# Patient Record
Sex: MALE | Race: WHITE | Employment: FULL TIME | URBAN - METROPOLITAN AREA
[De-identification: names, ages, dates, MRNs, and addresses within clinical notes are randomized per-mention and may not be internally consistent; named-entity substitution may affect disease eponyms.]

---

## 2017-01-05 ENCOUNTER — OFFICE VISIT (OUTPATIENT)
Dept: DERMATOLOGY | Age: 52
End: 2017-01-05

## 2017-01-05 DIAGNOSIS — L91.8 SKIN TAG: ICD-10-CM

## 2017-01-05 DIAGNOSIS — L71.9 ROSACEA: ICD-10-CM

## 2017-01-05 DIAGNOSIS — R20.9 DISTURBANCE OF SKIN SENSATION: ICD-10-CM

## 2017-01-05 DIAGNOSIS — Z12.83 SCREENING EXAM FOR SKIN CANCER: ICD-10-CM

## 2017-01-05 DIAGNOSIS — D22.9 MULTIPLE BENIGN NEVI: Primary | ICD-10-CM

## 2017-01-05 PROCEDURE — 99214 OFFICE O/P EST MOD 30 MIN: CPT | Performed by: DERMATOLOGY

## 2017-01-05 PROCEDURE — 11200 RMVL SKIN TAGS UP TO&INC 15: CPT | Performed by: DERMATOLOGY

## 2017-01-05 RX ORDER — AZELAIC ACID 0.15 G/G
GEL TOPICAL
Qty: 1 TUBE | Refills: 3 | Status: SHIPPED | OUTPATIENT
Start: 2017-01-05 | End: 2019-01-14 | Stop reason: SDUPTHER

## 2017-01-05 RX ORDER — NICOTINE POLACRILEX 4 MG/1
20 GUM, CHEWING ORAL DAILY
COMMUNITY
End: 2017-09-18 | Stop reason: ALTCHOICE

## 2017-01-05 RX ORDER — LOSARTAN POTASSIUM AND HYDROCHLOROTHIAZIDE 12.5; 1 MG/1; MG/1
1 TABLET ORAL DAILY
COMMUNITY
End: 2017-09-18 | Stop reason: SDUPTHER

## 2017-02-02 ENCOUNTER — PROCEDURE VISIT (OUTPATIENT)
Dept: DERMATOLOGY | Age: 52
End: 2017-02-02

## 2017-02-02 DIAGNOSIS — L71.9 ROSACEA: Primary | ICD-10-CM

## 2017-02-02 DIAGNOSIS — Z41.1 ELECTIVE PROCEDURE FOR UNACCEPTABLE COSMETIC APPEARANCE: ICD-10-CM

## 2017-02-02 PROCEDURE — DM01300 VBEAM LASER EXTRA SMALL AREA: Performed by: DERMATOLOGY

## 2017-03-02 ENCOUNTER — PROCEDURE VISIT (OUTPATIENT)
Dept: DERMATOLOGY | Age: 52
End: 2017-03-02

## 2017-03-02 DIAGNOSIS — L71.9 ROSACEA: Primary | ICD-10-CM

## 2017-03-02 DIAGNOSIS — Z41.1 ELECTIVE PROCEDURE FOR UNACCEPTABLE COSMETIC APPEARANCE: ICD-10-CM

## 2017-03-02 PROCEDURE — DM01300 VBEAM LASER EXTRA SMALL AREA: Performed by: DERMATOLOGY

## 2017-04-06 ENCOUNTER — PROCEDURE VISIT (OUTPATIENT)
Dept: DERMATOLOGY | Age: 52
End: 2017-04-06

## 2017-04-06 DIAGNOSIS — L71.9 ROSACEA: Primary | ICD-10-CM

## 2017-04-06 DIAGNOSIS — Z41.1 ELECTIVE PROCEDURE FOR UNACCEPTABLE COSMETIC APPEARANCE: ICD-10-CM

## 2017-04-06 PROCEDURE — DM01300 VBEAM LASER EXTRA SMALL AREA: Performed by: DERMATOLOGY

## 2017-04-07 ENCOUNTER — TELEPHONE (OUTPATIENT)
Dept: INTERNAL MEDICINE CLINIC | Age: 52
End: 2017-04-07

## 2017-04-07 RX ORDER — RABEPRAZOLE SODIUM 20 MG/1
TABLET, DELAYED RELEASE ORAL
Qty: 90 TABLET | Refills: 1 | Status: SHIPPED | OUTPATIENT
Start: 2017-04-07 | End: 2017-10-08 | Stop reason: SDUPTHER

## 2017-05-17 ENCOUNTER — OFFICE VISIT (OUTPATIENT)
Dept: INTERNAL MEDICINE CLINIC | Age: 52
End: 2017-05-17

## 2017-05-17 VITALS — SYSTOLIC BLOOD PRESSURE: 124 MMHG | HEART RATE: 74 BPM | DIASTOLIC BLOOD PRESSURE: 78 MMHG | OXYGEN SATURATION: 97 %

## 2017-05-17 DIAGNOSIS — M77.10 LATERAL EPICONDYLITIS  OF ELBOW: Primary | ICD-10-CM

## 2017-05-17 PROCEDURE — 99213 OFFICE O/P EST LOW 20 MIN: CPT | Performed by: FAMILY MEDICINE

## 2017-06-14 RX ORDER — LOSARTAN POTASSIUM AND HYDROCHLOROTHIAZIDE 12.5; 1 MG/1; MG/1
TABLET ORAL
Qty: 90 TABLET | Refills: 2 | Status: SHIPPED | OUTPATIENT
Start: 2017-06-14 | End: 2018-04-10 | Stop reason: SDUPTHER

## 2017-09-18 ENCOUNTER — HOSPITAL ENCOUNTER (OUTPATIENT)
Dept: GENERAL RADIOLOGY | Age: 52
Discharge: HOME | End: 2017-09-18
Attending: INTERNAL MEDICINE | Admitting: INTERNAL MEDICINE

## 2017-09-18 ENCOUNTER — OFFICE VISIT (OUTPATIENT)
Dept: INTERNAL MEDICINE CLINIC | Age: 52
End: 2017-09-18

## 2017-09-18 VITALS
DIASTOLIC BLOOD PRESSURE: 94 MMHG | OXYGEN SATURATION: 96 % | HEIGHT: 71 IN | SYSTOLIC BLOOD PRESSURE: 136 MMHG | BODY MASS INDEX: 35.42 KG/M2 | WEIGHT: 253 LBS | HEART RATE: 71 BPM

## 2017-09-18 DIAGNOSIS — I10 ESSENTIAL HYPERTENSION: ICD-10-CM

## 2017-09-18 DIAGNOSIS — Z12.9 SCREENING FOR CANCER: ICD-10-CM

## 2017-09-18 DIAGNOSIS — R06.83 SNORES: Primary | ICD-10-CM

## 2017-09-18 DIAGNOSIS — E03.9 ACQUIRED HYPOTHYROIDISM: ICD-10-CM

## 2017-09-18 DIAGNOSIS — R40.0 DAYTIME SLEEPINESS: ICD-10-CM

## 2017-09-18 DIAGNOSIS — E66.9 NON MORBID OBESITY, UNSPECIFIED OBESITY TYPE: ICD-10-CM

## 2017-09-18 DIAGNOSIS — G43.909 MIGRAINE WITHOUT STATUS MIGRAINOSUS, NOT INTRACTABLE, UNSPECIFIED MIGRAINE TYPE: ICD-10-CM

## 2017-09-18 DIAGNOSIS — C80.1 CANCER (HCC): ICD-10-CM

## 2017-09-18 DIAGNOSIS — K22.719 BARRETT'S ESOPHAGUS WITH DYSPLASIA: ICD-10-CM

## 2017-09-18 LAB
A/G RATIO: 1.3 (ref 1.1–2.2)
ALBUMIN SERPL-MCNC: 4 G/DL (ref 3.4–5)
ALP BLD-CCNC: 76 U/L (ref 40–129)
ALT SERPL-CCNC: 24 U/L (ref 10–40)
ANION GAP SERPL CALCULATED.3IONS-SCNC: 14 MMOL/L (ref 3–16)
AST SERPL-CCNC: 22 U/L (ref 15–37)
BILIRUB SERPL-MCNC: 0.8 MG/DL (ref 0–1)
BUN BLDV-MCNC: 14 MG/DL (ref 7–20)
CALCIUM SERPL-MCNC: 9.6 MG/DL (ref 8.3–10.6)
CHLORIDE BLD-SCNC: 96 MMOL/L (ref 99–110)
CHOLESTEROL, TOTAL: 196 MG/DL (ref 0–199)
CO2: 28 MMOL/L (ref 21–32)
CREAT SERPL-MCNC: 0.9 MG/DL (ref 0.9–1.3)
GFR AFRICAN AMERICAN: >60
GFR NON-AFRICAN AMERICAN: >60
GLOBULIN: 3.1 G/DL
GLUCOSE BLD-MCNC: 95 MG/DL (ref 70–99)
HDLC SERPL-MCNC: 36 MG/DL (ref 40–60)
LDL CHOLESTEROL CALCULATED: 134 MG/DL
POTASSIUM SERPL-SCNC: 3.6 MMOL/L (ref 3.5–5.1)
PROSTATE SPECIFIC ANTIGEN: 0.9 NG/ML (ref 0–4)
SODIUM BLD-SCNC: 138 MMOL/L (ref 136–145)
T4 FREE: 1.5 NG/DL (ref 0.9–1.8)
TOTAL PROTEIN: 7.1 G/DL (ref 6.4–8.2)
TRIGL SERPL-MCNC: 131 MG/DL (ref 0–150)
TSH SERPL DL<=0.05 MIU/L-ACNC: 0.66 UIU/ML (ref 0.27–4.2)
VLDLC SERPL CALC-MCNC: 26 MG/DL

## 2017-09-18 PROCEDURE — 99215 OFFICE O/P EST HI 40 MIN: CPT | Performed by: INTERNAL MEDICINE

## 2017-09-18 RX ORDER — AMLODIPINE BESYLATE 5 MG/1
5 TABLET ORAL DAILY
Qty: 30 TABLET | Refills: 3 | Status: SHIPPED | OUTPATIENT
Start: 2017-09-18 | End: 2018-01-22 | Stop reason: SDUPTHER

## 2017-09-18 RX ORDER — LEVOTHYROXINE SODIUM 175 UG/1
TABLET ORAL
Qty: 34 TABLET | Refills: 9 | Status: SHIPPED | OUTPATIENT
Start: 2017-09-18 | End: 2018-01-22 | Stop reason: SDUPTHER

## 2017-09-18 ASSESSMENT — PATIENT HEALTH QUESTIONNAIRE - PHQ9
1. LITTLE INTEREST OR PLEASURE IN DOING THINGS: 0
SUM OF ALL RESPONSES TO PHQ QUESTIONS 1-9: 0
SUM OF ALL RESPONSES TO PHQ9 QUESTIONS 1 & 2: 0
2. FEELING DOWN, DEPRESSED OR HOPELESS: 0

## 2017-09-21 ENCOUNTER — TELEPHONE (OUTPATIENT)
Dept: INTERNAL MEDICINE CLINIC | Age: 52
End: 2017-09-21

## 2017-11-03 ENCOUNTER — NURSE ONLY (OUTPATIENT)
Dept: INTERNAL MEDICINE CLINIC | Age: 52
End: 2017-11-03

## 2017-11-03 DIAGNOSIS — Z23 NEED FOR PROPHYLACTIC VACCINATION AND INOCULATION AGAINST INFLUENZA: Primary | ICD-10-CM

## 2017-11-03 PROCEDURE — 90688 IIV4 VACCINE SPLT 0.5 ML IM: CPT | Performed by: NURSE PRACTITIONER

## 2017-11-03 PROCEDURE — 90471 IMMUNIZATION ADMIN: CPT | Performed by: NURSE PRACTITIONER

## 2017-12-30 DIAGNOSIS — J06.9 UPPER RESPIRATORY TRACT INFECTION, UNSPECIFIED TYPE: Primary | ICD-10-CM

## 2017-12-30 RX ORDER — AZITHROMYCIN 250 MG/1
TABLET, FILM COATED ORAL
Qty: 1 PACKET | Refills: 0 | Status: SHIPPED | OUTPATIENT
Start: 2017-12-30 | End: 2018-01-09

## 2018-01-22 ENCOUNTER — TELEPHONE (OUTPATIENT)
Dept: INTERNAL MEDICINE CLINIC | Age: 53
End: 2018-01-22

## 2018-01-22 RX ORDER — RABEPRAZOLE SODIUM 20 MG/1
20 TABLET, DELAYED RELEASE ORAL DAILY
Qty: 90 TABLET | Refills: 3 | Status: SHIPPED | OUTPATIENT
Start: 2018-01-22 | End: 2018-07-19

## 2018-01-22 RX ORDER — AMLODIPINE BESYLATE 5 MG/1
5 TABLET ORAL DAILY
Qty: 90 TABLET | Refills: 3 | Status: SHIPPED | OUTPATIENT
Start: 2018-01-22 | End: 2018-12-29 | Stop reason: SDUPTHER

## 2018-01-22 RX ORDER — LEVOTHYROXINE SODIUM 175 UG/1
TABLET ORAL
Qty: 100 TABLET | Refills: 3 | Status: SHIPPED | OUTPATIENT
Start: 2018-01-22 | End: 2019-01-01 | Stop reason: SDUPTHER

## 2018-01-22 NOTE — TELEPHONE ENCOUNTER
Refill request for LEVOTHYROXINE, RABEPRAZOLE, AMOLDIPINE medication.      Name of Andrzej Contreras      Last visit - 11/3/17     Pending visit - NONE    Last refill - LEVOTHYROXINE 9/18/17, RABEPRAZOLE 10/8/17, AMLODIPINE 9/18/17      Medication Contract signed -   Last Oarrs ran-         Additional Comments

## 2018-02-23 RX ORDER — ZOLPIDEM TARTRATE 5 MG/1
2.5 TABLET ORAL NIGHTLY PRN
Qty: 8 TABLET | Refills: 0 | Status: SHIPPED | OUTPATIENT
Start: 2018-02-23 | End: 2018-03-25

## 2018-04-11 RX ORDER — LOSARTAN POTASSIUM AND HYDROCHLOROTHIAZIDE 12.5; 1 MG/1; MG/1
TABLET ORAL
Qty: 90 TABLET | Refills: 1 | Status: SHIPPED | OUTPATIENT
Start: 2018-04-11 | End: 2018-10-21 | Stop reason: SDUPTHER

## 2018-07-19 ENCOUNTER — TELEPHONE (OUTPATIENT)
Dept: INTERNAL MEDICINE CLINIC | Age: 53
End: 2018-07-19

## 2018-07-19 DIAGNOSIS — I10 ESSENTIAL HYPERTENSION: Primary | ICD-10-CM

## 2018-07-19 RX ORDER — OMEPRAZOLE 20 MG/1
20 CAPSULE, DELAYED RELEASE ORAL
Qty: 90 CAPSULE | Refills: 0 | Status: SHIPPED | OUTPATIENT
Start: 2018-07-19 | End: 2018-09-30 | Stop reason: SDUPTHER

## 2018-07-19 NOTE — TELEPHONE ENCOUNTER
: Patient: Change in medicine request emailed to pharmacy. Patient past due for chemistry panel which has been ordered in Epic before office visit with me.   Dr. day

## 2018-12-30 RX ORDER — OMEPRAZOLE 20 MG/1
CAPSULE, DELAYED RELEASE ORAL
Qty: 90 CAPSULE | Refills: 0 | Status: SHIPPED | OUTPATIENT
Start: 2018-12-30 | End: 2019-03-29 | Stop reason: SDUPTHER

## 2019-01-01 ENCOUNTER — TELEPHONE (OUTPATIENT)
Dept: INTERNAL MEDICINE CLINIC | Age: 54
End: 2019-01-01

## 2019-01-01 DIAGNOSIS — Z12.5 SPECIAL SCREENING FOR MALIGNANT NEOPLASM OF PROSTATE: ICD-10-CM

## 2019-01-01 DIAGNOSIS — E89.0 POSTOPERATIVE HYPOTHYROIDISM: ICD-10-CM

## 2019-01-01 DIAGNOSIS — C80.1 CANCER (HCC): Primary | ICD-10-CM

## 2019-01-01 RX ORDER — LEVOTHYROXINE SODIUM 175 UG/1
TABLET ORAL
Qty: 100 TABLET | Refills: 0 | Status: SHIPPED | OUTPATIENT
Start: 2019-01-01 | End: 2019-03-30 | Stop reason: SDUPTHER

## 2019-01-08 DIAGNOSIS — I10 ESSENTIAL HYPERTENSION: Primary | ICD-10-CM

## 2019-01-08 DIAGNOSIS — C80.1 CANCER (HCC): Primary | ICD-10-CM

## 2019-01-08 RX ORDER — LOSARTAN POTASSIUM AND HYDROCHLOROTHIAZIDE 12.5; 1 MG/1; MG/1
TABLET ORAL
Qty: 30 TABLET | Refills: 0 | Status: SHIPPED | OUTPATIENT
Start: 2019-01-08 | End: 2019-02-13 | Stop reason: SDUPTHER

## 2019-01-14 ENCOUNTER — OFFICE VISIT (OUTPATIENT)
Dept: INTERNAL MEDICINE CLINIC | Age: 54
End: 2019-01-14
Payer: COMMERCIAL

## 2019-01-14 ENCOUNTER — HOSPITAL ENCOUNTER (OUTPATIENT)
Age: 54
Discharge: HOME | End: 2019-01-14
Payer: COMMERCIAL

## 2019-01-14 VITALS
HEIGHT: 71 IN | SYSTOLIC BLOOD PRESSURE: 132 MMHG | DIASTOLIC BLOOD PRESSURE: 84 MMHG | BODY MASS INDEX: 36.68 KG/M2 | HEART RATE: 71 BPM | OXYGEN SATURATION: 97 % | WEIGHT: 262 LBS

## 2019-01-14 DIAGNOSIS — K21.9 GASTROESOPHAGEAL REFLUX DISEASE WITHOUT ESOPHAGITIS: ICD-10-CM

## 2019-01-14 DIAGNOSIS — C80.1 CANCER (HCC): ICD-10-CM

## 2019-01-14 DIAGNOSIS — L71.9 ROSACEA: ICD-10-CM

## 2019-01-14 DIAGNOSIS — E66.9 NON MORBID OBESITY: ICD-10-CM

## 2019-01-14 DIAGNOSIS — E03.9 ACQUIRED HYPOTHYROIDISM: ICD-10-CM

## 2019-01-14 DIAGNOSIS — I10 ESSENTIAL HYPERTENSION: Primary | ICD-10-CM

## 2019-01-14 DIAGNOSIS — Z23 NEED FOR PROPHYLACTIC VACCINATION WITH TETANUS-DIPHTHERIA (TD): ICD-10-CM

## 2019-01-14 DIAGNOSIS — E89.0 POSTOPERATIVE HYPOTHYROIDISM: ICD-10-CM

## 2019-01-14 DIAGNOSIS — Z12.5 SPECIAL SCREENING FOR MALIGNANT NEOPLASM OF PROSTATE: ICD-10-CM

## 2019-01-14 DIAGNOSIS — I10 ESSENTIAL HYPERTENSION: ICD-10-CM

## 2019-01-14 DIAGNOSIS — K22.719 BARRETT'S ESOPHAGUS WITH DYSPLASIA: ICD-10-CM

## 2019-01-14 LAB
A/G RATIO: 1.6 (ref 1.1–2.2)
ALBUMIN SERPL-MCNC: 4.4 G/DL (ref 3.4–5)
ALP BLD-CCNC: 71 U/L (ref 40–129)
ALT SERPL-CCNC: 25 U/L (ref 10–40)
ANION GAP SERPL CALCULATED.3IONS-SCNC: 16 MMOL/L (ref 3–16)
AST SERPL-CCNC: 24 U/L (ref 15–37)
BILIRUB SERPL-MCNC: 0.9 MG/DL (ref 0–1)
BUN BLDV-MCNC: 14 MG/DL (ref 7–20)
CALCIUM SERPL-MCNC: 9.4 MG/DL (ref 8.3–10.6)
CHLORIDE BLD-SCNC: 99 MMOL/L (ref 99–110)
CO2: 25 MMOL/L (ref 21–32)
CREAT SERPL-MCNC: 1 MG/DL (ref 0.9–1.3)
GFR AFRICAN AMERICAN: >60
GFR NON-AFRICAN AMERICAN: >60
GLOBULIN: 2.7 G/DL
GLUCOSE BLD-MCNC: 93 MG/DL (ref 70–99)
POTASSIUM SERPL-SCNC: 3.6 MMOL/L (ref 3.5–5.1)
PROSTATE SPECIFIC ANTIGEN: 0.68 NG/ML (ref 0–4)
SODIUM BLD-SCNC: 140 MMOL/L (ref 136–145)
T4 FREE: 1.5 NG/DL (ref 0.9–1.8)
TOTAL PROTEIN: 7.1 G/DL (ref 6.4–8.2)
TSH SERPL DL<=0.05 MIU/L-ACNC: 1.63 UIU/ML (ref 0.27–4.2)

## 2019-01-14 PROCEDURE — 36415 COLL VENOUS BLD VENIPUNCTURE: CPT

## 2019-01-14 PROCEDURE — 84153 ASSAY OF PSA TOTAL: CPT

## 2019-01-14 PROCEDURE — 90714 TD VACC NO PRESV 7 YRS+ IM: CPT | Performed by: INTERNAL MEDICINE

## 2019-01-14 PROCEDURE — 84439 ASSAY OF FREE THYROXINE: CPT

## 2019-01-14 PROCEDURE — 80053 COMPREHEN METABOLIC PANEL: CPT

## 2019-01-14 PROCEDURE — 84443 ASSAY THYROID STIM HORMONE: CPT

## 2019-01-14 PROCEDURE — 90471 IMMUNIZATION ADMIN: CPT | Performed by: INTERNAL MEDICINE

## 2019-01-14 PROCEDURE — 99214 OFFICE O/P EST MOD 30 MIN: CPT | Performed by: INTERNAL MEDICINE

## 2019-01-14 RX ORDER — AZELAIC ACID 0.15 G/G
GEL TOPICAL
Qty: 1 TUBE | Refills: 3 | Status: SHIPPED | OUTPATIENT
Start: 2019-01-14 | End: 2019-01-16 | Stop reason: SDUPTHER

## 2019-01-14 ASSESSMENT — PATIENT HEALTH QUESTIONNAIRE - PHQ9
1. LITTLE INTEREST OR PLEASURE IN DOING THINGS: 0
SUM OF ALL RESPONSES TO PHQ9 QUESTIONS 1 & 2: 0
SUM OF ALL RESPONSES TO PHQ QUESTIONS 1-9: 0
SUM OF ALL RESPONSES TO PHQ QUESTIONS 1-9: 0
2. FEELING DOWN, DEPRESSED OR HOPELESS: 0

## 2019-01-15 DIAGNOSIS — L71.9 ROSACEA: ICD-10-CM

## 2019-01-15 RX ORDER — AZELAIC ACID 0.15 G/G
GEL TOPICAL
Qty: 1 TUBE | Refills: 3 | Status: CANCELLED | OUTPATIENT
Start: 2019-01-15

## 2019-01-16 DIAGNOSIS — L71.9 ROSACEA: ICD-10-CM

## 2019-01-16 RX ORDER — AZELAIC ACID 0.15 G/G
GEL TOPICAL
Qty: 1 TUBE | Refills: 3 | Status: SHIPPED | OUTPATIENT
Start: 2019-01-16 | End: 2019-12-03 | Stop reason: ALTCHOICE

## 2019-02-13 DIAGNOSIS — I10 ESSENTIAL HYPERTENSION: ICD-10-CM

## 2019-02-13 RX ORDER — LOSARTAN POTASSIUM AND HYDROCHLOROTHIAZIDE 12.5; 1 MG/1; MG/1
TABLET ORAL
Qty: 30 TABLET | Refills: 0 | Status: SHIPPED | OUTPATIENT
Start: 2019-02-13 | End: 2019-03-17 | Stop reason: SDUPTHER

## 2019-03-29 RX ORDER — OMEPRAZOLE 20 MG/1
CAPSULE, DELAYED RELEASE ORAL
Qty: 90 CAPSULE | Refills: 0 | Status: SHIPPED | OUTPATIENT
Start: 2019-03-29 | End: 2019-05-07 | Stop reason: SDUPTHER

## 2019-04-23 ENCOUNTER — PATIENT MESSAGE (OUTPATIENT)
Dept: INTERNAL MEDICINE CLINIC | Age: 54
End: 2019-04-23

## 2019-04-23 DIAGNOSIS — Z00.00 ROUTINE GENERAL MEDICAL EXAMINATION AT A HEALTH CARE FACILITY: Primary | ICD-10-CM

## 2019-04-23 NOTE — TELEPHONE ENCOUNTER
From: Josh Russo  To: GURPREET Hernandez - CNP  Sent: 4/23/2019 9:12 AM EDT  Subject: Non-Urgent Medical Question    I know I have an appointment coming up July 10 to review blood work you will request prior to my appointment. Can you please make sure they include a TSH and a thyroglobulin? I need to check my markers. Also like to check my blood sugar levels as well. I am required to have the TSH and thyroglobulin tested twice a year for 20 years.  TSH 0.5-2.5 unites/mL and thyroglobulin less than 1 ng/mL    Thanks you

## 2019-04-30 DIAGNOSIS — Z13.1 SCREENING FOR DIABETES MELLITUS (DM): Primary | ICD-10-CM

## 2019-04-30 DIAGNOSIS — I10 ESSENTIAL HYPERTENSION: ICD-10-CM

## 2019-05-07 DIAGNOSIS — I10 ESSENTIAL HYPERTENSION: ICD-10-CM

## 2019-05-07 RX ORDER — AMLODIPINE BESYLATE 5 MG/1
5 TABLET ORAL DAILY
Qty: 90 TABLET | Refills: 1 | Status: SHIPPED | OUTPATIENT
Start: 2019-05-07 | End: 2019-05-08 | Stop reason: SDUPTHER

## 2019-05-07 RX ORDER — OMEPRAZOLE 20 MG/1
CAPSULE, DELAYED RELEASE ORAL
Qty: 90 CAPSULE | Refills: 0 | Status: CANCELLED | OUTPATIENT
Start: 2019-05-07

## 2019-05-07 RX ORDER — LEVOTHYROXINE SODIUM 175 UG/1
TABLET ORAL
Qty: 100 TABLET | Refills: 2 | Status: SHIPPED | OUTPATIENT
Start: 2019-05-07 | End: 2019-05-15 | Stop reason: SDUPTHER

## 2019-05-07 RX ORDER — OMEPRAZOLE 20 MG/1
CAPSULE, DELAYED RELEASE ORAL
Qty: 90 CAPSULE | Refills: 0 | Status: SHIPPED | OUTPATIENT
Start: 2019-05-07 | End: 2019-05-08 | Stop reason: SDUPTHER

## 2019-05-07 RX ORDER — LOSARTAN POTASSIUM AND HYDROCHLOROTHIAZIDE 12.5; 1 MG/1; MG/1
TABLET ORAL
Qty: 30 TABLET | Refills: 6 | Status: SHIPPED | OUTPATIENT
Start: 2019-05-07 | End: 2019-05-08 | Stop reason: SDUPTHER

## 2019-05-07 NOTE — TELEPHONE ENCOUNTER
Refill request for Prilosec , Hyzaar,levothyroxine , Norvasc  medication. Name of [facility]acy- [facility]    Last visit - 1-14-19     Pending visit - 5-15-19    Last refill -3-31-19/3-29-19/3-18-19    Medication Contract signed -   Last Fabby Sawyer ran-     Additional Comments awaiting phone number or address for pharmacy.

## 2019-05-08 DIAGNOSIS — I10 ESSENTIAL HYPERTENSION: ICD-10-CM

## 2019-05-08 RX ORDER — OMEPRAZOLE 20 MG/1
CAPSULE, DELAYED RELEASE ORAL
Qty: 90 CAPSULE | Refills: 1 | Status: SHIPPED | OUTPATIENT
Start: 2019-05-08 | End: 2019-05-09 | Stop reason: SDUPTHER

## 2019-05-08 RX ORDER — LOSARTAN POTASSIUM AND HYDROCHLOROTHIAZIDE 12.5; 1 MG/1; MG/1
TABLET ORAL
Qty: 90 TABLET | Refills: 1 | Status: SHIPPED | OUTPATIENT
Start: 2019-05-08

## 2019-05-08 RX ORDER — AMLODIPINE BESYLATE 5 MG/1
5 TABLET ORAL DAILY
Qty: 90 TABLET | Refills: 1 | Status: SHIPPED | OUTPATIENT
Start: 2019-05-08

## 2019-05-09 DIAGNOSIS — Z13.1 SCREENING FOR DIABETES MELLITUS (DM): ICD-10-CM

## 2019-05-09 DIAGNOSIS — Z00.00 ROUTINE GENERAL MEDICAL EXAMINATION AT A HEALTH CARE FACILITY: ICD-10-CM

## 2019-05-09 DIAGNOSIS — I10 ESSENTIAL HYPERTENSION: ICD-10-CM

## 2019-05-09 RX ORDER — OMEPRAZOLE 20 MG/1
CAPSULE, DELAYED RELEASE ORAL
Qty: 90 CAPSULE | Refills: 1 | Status: SHIPPED | OUTPATIENT
Start: 2019-05-09

## 2019-05-09 NOTE — TELEPHONE ENCOUNTER
I have called [facility] today 3 times today and each time it hangs up on me. Patient was in today and told me his omeprazole was denied. They told me that it needs to be for tablets instead of capsules. I am just going to pend an order for tablets. Will you sign off on this?

## 2019-05-10 LAB
ANION GAP SERPL CALCULATED.3IONS-SCNC: 14 MMOL/L (ref 3–16)
BASOPHILS ABSOLUTE: 0 K/UL (ref 0–0.2)
BASOPHILS RELATIVE PERCENT: 0.7 %
BUN BLDV-MCNC: 14 MG/DL (ref 7–20)
CALCIUM SERPL-MCNC: 9.8 MG/DL (ref 8.3–10.6)
CHLORIDE BLD-SCNC: 101 MMOL/L (ref 99–110)
CHOLESTEROL, TOTAL: 196 MG/DL (ref 0–199)
CO2: 25 MMOL/L (ref 21–32)
CREAT SERPL-MCNC: 1.1 MG/DL (ref 0.9–1.3)
EOSINOPHILS ABSOLUTE: 0.1 K/UL (ref 0–0.6)
EOSINOPHILS RELATIVE PERCENT: 2 %
ESTIMATED AVERAGE GLUCOSE: 125.5 MG/DL
GFR AFRICAN AMERICAN: >60
GFR NON-AFRICAN AMERICAN: >60
GLUCOSE BLD-MCNC: 115 MG/DL (ref 70–99)
HBA1C MFR BLD: 6 %
HCT VFR BLD CALC: 53.8 % (ref 40.5–52.5)
HDLC SERPL-MCNC: 33 MG/DL (ref 40–60)
HEMOGLOBIN: 17.9 G/DL (ref 13.5–17.5)
LDL CHOLESTEROL CALCULATED: 144 MG/DL
LYMPHOCYTES ABSOLUTE: 1.3 K/UL (ref 1–5.1)
LYMPHOCYTES RELATIVE PERCENT: 23.3 %
MCH RBC QN AUTO: 28.5 PG (ref 26–34)
MCHC RBC AUTO-ENTMCNC: 33.4 G/DL (ref 31–36)
MCV RBC AUTO: 85.5 FL (ref 80–100)
MONOCYTES ABSOLUTE: 0.5 K/UL (ref 0–1.3)
MONOCYTES RELATIVE PERCENT: 9.3 %
NEUTROPHILS ABSOLUTE: 3.6 K/UL (ref 1.7–7.7)
NEUTROPHILS RELATIVE PERCENT: 64.7 %
PDW BLD-RTO: 14.6 % (ref 12.4–15.4)
PLATELET # BLD: 248 K/UL (ref 135–450)
PMV BLD AUTO: 7.2 FL (ref 5–10.5)
POTASSIUM SERPL-SCNC: 3.9 MMOL/L (ref 3.5–5.1)
RBC # BLD: 6.29 M/UL (ref 4.2–5.9)
SODIUM BLD-SCNC: 140 MMOL/L (ref 136–145)
TRIGL SERPL-MCNC: 97 MG/DL (ref 0–150)
TSH REFLEX: 0.57 UIU/ML (ref 0.27–4.2)
VLDLC SERPL CALC-MCNC: 19 MG/DL
WBC # BLD: 5.5 K/UL (ref 4–11)

## 2019-05-15 ENCOUNTER — OFFICE VISIT (OUTPATIENT)
Dept: INTERNAL MEDICINE CLINIC | Age: 54
End: 2019-05-15
Payer: COMMERCIAL

## 2019-05-15 VITALS
HEART RATE: 81 BPM | BODY MASS INDEX: 35.7 KG/M2 | DIASTOLIC BLOOD PRESSURE: 82 MMHG | WEIGHT: 256 LBS | OXYGEN SATURATION: 97 % | SYSTOLIC BLOOD PRESSURE: 136 MMHG

## 2019-05-15 DIAGNOSIS — R73.02 IMPAIRED GLUCOSE TOLERANCE: ICD-10-CM

## 2019-05-15 DIAGNOSIS — I10 ESSENTIAL HYPERTENSION: Primary | ICD-10-CM

## 2019-05-15 DIAGNOSIS — E78.2 MIXED HYPERLIPIDEMIA: ICD-10-CM

## 2019-05-15 DIAGNOSIS — Z87.19 HISTORY OF BARRETT'S ESOPHAGUS: ICD-10-CM

## 2019-05-15 DIAGNOSIS — Z12.11 ENCOUNTER FOR SCREENING COLONOSCOPY: ICD-10-CM

## 2019-05-15 DIAGNOSIS — E89.0 POSTOPERATIVE HYPOTHYROIDISM: ICD-10-CM

## 2019-05-15 DIAGNOSIS — E66.09 CLASS 2 OBESITY DUE TO EXCESS CALORIES WITHOUT SERIOUS COMORBIDITY WITH BODY MASS INDEX (BMI) OF 35.0 TO 35.9 IN ADULT: ICD-10-CM

## 2019-05-15 DIAGNOSIS — D75.1 ERYTHROCYTOSIS: ICD-10-CM

## 2019-05-15 PROCEDURE — 99214 OFFICE O/P EST MOD 30 MIN: CPT | Performed by: FAMILY MEDICINE

## 2019-05-15 RX ORDER — LEVOTHYROXINE SODIUM 175 UG/1
TABLET ORAL
Qty: 124 TABLET | Refills: 3 | Status: SHIPPED | OUTPATIENT
Start: 2019-05-15

## 2019-05-15 NOTE — PROGRESS NOTES
Edward Esposito      Chief Complaint   Patient presents with    Hypertension    Hypothyroidism    Gastroesophageal Reflux    Obesity       HPI:     Weight is down 6 lbs in the last 4 months  Trying to eat healthy diet, and is active at home and on the golf course. 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 Energy levels feel good. GERD is well-controlled on PPI, but insurance is mandating a change. H/o Keane[facility]. Overdue for repeat EGD. HTN: BP is well-controlled on losartan/HCTZ 100/12.5 mg    I personally reviewed and updated patient's past medical history, past surgical history, family history, allergies, and social history as captured in the relevant section ofpatient's chart. Current Outpatient Medications on File Prior to Visit   Medication Sig Dispense Refill    omeprazole (PRILOSEC) 20 MG delayed release capsule TAKE 1 TABLET EVERY MORNING BEFORE BREAKFAST (STOMACH ACID MEDICINE. 90 capsule 1    losartan-hydrochlorothiazide (HYZAAR) 100-12.5 MG per tablet TAKE ONE TABLET BY MOUTH DAILY FOR HIGH BLOOD PRESSURE 90 tablet 1    amLODIPine (NORVASC) 5 MG tablet Take 1 tablet by mouth daily For high blood pressure. 90 tablet 1    Azelaic Acid (FINACEA) 15 % GEL Apply small amount to face BID. 1 Tube 3     No current facility-administered medications on file prior to visit. Review of Systems   Constitutional: Negative for activity change, appetite change, chills, fatigue, fever and unexpected weight change. HENT: Negative for congestion, nosebleeds, postnasal drip, rhinorrhea, sore throat and trouble swallowing. Eyes: Negative for visual disturbance. Respiratory: Negative for cough, shortness of breath and wheezing. Cardiovascular: Negative for chest pain and leg swelling.    Gastrointestinal: Negative for abdominal pain, constipation, diarrhea, nausea and due to dehydration  Discussed that this can continue to be watched, though I don't think is suggestive of significant underlying pathology      No follow-ups on file.

## 2019-05-16 LAB — THYROGLOBULIN BY LC-MS/MS, SERUM/PLASMA: <0.5 NG/ML (ref 1.3–31.8)

## 2019-05-21 ASSESSMENT — ENCOUNTER SYMPTOMS
SORE THROAT: 0
WHEEZING: 0
VOMITING: 0
TROUBLE SWALLOWING: 0
RHINORRHEA: 0
COUGH: 0
ABDOMINAL PAIN: 0
NAUSEA: 0
SHORTNESS OF BREATH: 0
CONSTIPATION: 0
BACK PAIN: 0
DIARRHEA: 0

## 2019-06-17 ENCOUNTER — PATIENT MESSAGE (OUTPATIENT)
Dept: INTERNAL MEDICINE CLINIC | Age: 54
End: 2019-06-17

## 2019-06-17 DIAGNOSIS — M65.30 TRIGGER FINGER, UNSPECIFIED FINGER, UNSPECIFIED LATERALITY: Primary | ICD-10-CM

## 2019-06-17 NOTE — TELEPHONE ENCOUNTER
From: Edward Pages  To: Rosa Dawn MD  Sent: 6/17/2019 10:50 AM EDT  Subject: Non-Urgent Medical Question    Dr Ellen Rivera     My trigger finger has now become painful down into my nodule. Who do you recommend I see for possible surgery or cortisone shot.      Thank you

## 2019-06-20 ENCOUNTER — OFFICE VISIT (OUTPATIENT)
Dept: ORTHOPEDIC SURGERY | Age: 54
End: 2019-06-20
Payer: COMMERCIAL

## 2019-06-20 VITALS — WEIGHT: 255.95 LBS | HEIGHT: 71 IN | BODY MASS INDEX: 35.83 KG/M2 | RESPIRATION RATE: 17 BRPM

## 2019-06-20 DIAGNOSIS — M65.341 TRIGGER FINGER, RIGHT RING FINGER: ICD-10-CM

## 2019-06-20 PROCEDURE — 99243 OFF/OP CNSLTJ NEW/EST LOW 30: CPT | Performed by: ORTHOPAEDIC SURGERY

## 2019-06-20 PROCEDURE — 20550 NJX 1 TENDON SHEATH/LIGAMENT: CPT | Performed by: ORTHOPAEDIC SURGERY

## 2019-06-20 NOTE — PROGRESS NOTES
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 Misty Vargas and Philippe Tamayo CNP, regarding his right ring finger. He has noticed over the last 2 months increasing tenderness over the ring finger with triggering. This is become very painful in the mornings and when he  a golf club or gardening tools. PAST MEDICAL HISTORY: Patient's medications, allergies, past medical, surgical, social and family histories were reviewed and updated as appropriate. ROS: Pertinent items are noted in HPI. Review of systems reviewed from patient history form dated on 6/20/19 and available in the patient's chart under the media tab. Denies fever, chills, confusion, bowel/bladder active change. PHYSICAL EXAMINATION: Examination reveals a pleasant individual in no acute distress. There appears to be satisfactory pain-free range of motion, strength, and stability of the cervical spine, shoulders, and elbows. Skin is intact without lymphadenopathy, discoloration, or abnormal temperature. There is intact, symmetric circulation in both upper extremities. Wrist, hand, and digital range of motion is satisfactory bilaterally in the unaffected digits. Tenderness exists at the A1 pulley of the right ring finger with grade 2 triggering. Evocative testing for carpal tunnel syndrome is negative. DIAGNOSTIC TESTING:        IMPRESSION AND PLAN: Painful right ring trigger digit. I have talked with him about the diagnosis and the appropriate treatment options. He is amenable to an initial course of action with cortisone injection. Under sterile conditions, I injected the right ring finger at the A1 pulley and flexor tendon sheath with 1% lidocaine and 1 mL of Celestone. Appropriate injection risks and instructions were discussed.     We both discussed the plan of action for return to my office over the next several weeks if he fails to have lasting resolution of his symptoms with injection alone. Additional options down the road would involve straightforward outpatient trigger finger release surgery. Please note that this transcription was created using voice recognition software. Any errors are unintentional and may be due to voice recognition transcription.

## 2019-06-20 NOTE — PROGRESS NOTES
INJECTION ADMINISTRATION DETAILS:    Date & Time:  6/20/19 9:08 AM  Site & Comments: RT RING TRIGGER FINGER  Administered by Dr Huang Lazaro    Betamethasone (30 mg/ml)  # of Cc: 1  NDC:  2528-9315-27  Lot Number: [id number]  EXP: 04/2020    1% Lidocaine ( 10mg/mL)  # of CC : 1  NDC #: 6703-7256-72  LOT# :[id number]  EXP :11/2020

## 2019-12-03 ENCOUNTER — OFFICE VISIT (OUTPATIENT)
Dept: DERMATOLOGY | Age: 54
End: 2019-12-03
Payer: COMMERCIAL

## 2019-12-03 DIAGNOSIS — D22.9 MULTIPLE BENIGN NEVI: Primary | ICD-10-CM

## 2019-12-03 DIAGNOSIS — L71.9 ROSACEA: ICD-10-CM

## 2019-12-03 DIAGNOSIS — Z12.83 SCREENING EXAM FOR SKIN CANCER: ICD-10-CM

## 2019-12-03 DIAGNOSIS — L82.1 SEBORRHEIC KERATOSES: ICD-10-CM

## 2019-12-03 DIAGNOSIS — L57.0 ACTINIC KERATOSES: ICD-10-CM

## 2019-12-03 DIAGNOSIS — D48.5 NEOPLASM OF UNCERTAIN BEHAVIOR OF SKIN: ICD-10-CM

## 2019-12-03 PROCEDURE — 17000 DESTRUCT PREMALG LESION: CPT | Performed by: DERMATOLOGY

## 2019-12-03 PROCEDURE — 17003 DESTRUCT PREMALG LES 2-14: CPT | Performed by: DERMATOLOGY

## 2019-12-03 PROCEDURE — 99213 OFFICE O/P EST LOW 20 MIN: CPT | Performed by: DERMATOLOGY

## 2019-12-03 PROCEDURE — 11102 TANGNTL BX SKIN SINGLE LES: CPT | Performed by: DERMATOLOGY

## 2019-12-03 RX ORDER — ATORVASTATIN CALCIUM 20 MG/1
20 TABLET, FILM COATED ORAL
COMMUNITY
Start: 2019-11-13

## 2019-12-06 LAB — DERMATOLOGY PATHOLOGY REPORT: NORMAL

## 2020-01-30 ENCOUNTER — OFFICE VISIT (OUTPATIENT)
Dept: ORTHOPEDIC SURGERY | Age: 55
End: 2020-01-30
Payer: COMMERCIAL

## 2020-01-30 VITALS — HEIGHT: 71 IN | BODY MASS INDEX: 35.83 KG/M2 | RESPIRATION RATE: 17 BRPM | WEIGHT: 255.95 LBS

## 2020-01-30 PROCEDURE — 99213 OFFICE O/P EST LOW 20 MIN: CPT | Performed by: ORTHOPAEDIC SURGERY

## 2020-02-25 ENCOUNTER — TELEPHONE (OUTPATIENT)
Dept: ORTHOPEDIC SURGERY | Age: 55
End: 2020-02-25

## 2020-02-25 NOTE — TELEPHONE ENCOUNTER
Auth: NPR  Date: 3/11/2020  Reference # None  Spoke with: None  Type of SX: Outpatient  Location: [facility]  CPT [id number]   SX area: Rt hand

## 2020-03-09 NOTE — PROGRESS NOTES
Cecily Parker    Age 53 y.o.    male    1965    MRN [id number]    3/11/2020  Arrival Time_____________  OR Time____________30 [address]     Procedure(s):  RIGHT RING TRIGGER FINGER RELEASE                      Local    Surgeon(s):  Abe Arndt, MD       Phone [phone] (home) [phone] (work)    [address] [facility] [place]  Cell Work  _________________________________________________________________  _________________________________________________________________  _________________________________________________________________  _________________________________________________________________  _________________________________________________________________      PCP _____________________________ Phone_________________       H&P__________________Bringing    Chart            Epic  DOS     Called_______  EKG__________________Bringing    Chart            Epic  DOS     Called_______  LAB__________________ Bringing    Chart            Epic  DOS     Called_______  Cardiac Clearance_______Bringing    Chart            Epic      DOS       Called_______    Cardiologist________________________ Phone___________________________      ? Yarsani concerns / Waiver on Chart            PAT Communications_____________  ? Pre-op Instructions Given [place]          ______________________________  ? Directions to Surgery Center                          ______________________________  ? Transportation Home_______________      _______________________________  ?  Crutches/Walker__________________        _______________________________      ________Pre-op Orders   _______Transcribed    _______Wt.  ________Pharmacy          _______SCD  ______VTE     ______Beta Blocker  ________Consent             ________TED Trisha Greenhouse

## 2020-03-10 NOTE — H&P
HISTORY & PHYSICAL FOR LOCAL CASES    There were no vitals filed for this visit. History of present illness: Right ring trigger finger    All allergies & meds reviewed  RELEVANT EXAMS:  Airway:  normal    Pulmonary: normal    Cardiovascular: normal    Abdominal: normal    Specific to procedure: Right ring trigger finger    I have reviewed with the patient and/or family the risks, benefits and alternatives to the procedure.   ASA Class:  2    The patient condition is acceptable for planned local anesthesia:

## 2020-03-10 NOTE — OP NOTE
[address] [facility]  Procedure Note  [facility]      José Manuel Whiteside  3/11/2020    PREOPERATIVE DIAGNOSIS(ES)   Right Ring Finger trigger digit     POSTOPERATIVE DIAGNOSIS(ES)   Same    PROCEDURE(S)     Right Ring Finger trigger release    SURGEON FLORA STACY    ANESTHESIA Local    EBL: less than 52WV    COMPLICATIONS None    INDICATIONS FOR PROCEDURE The patient has clinical evidence of triggering of the affected digit(s) and has failed appropriate conservative management. The patient understands the relevant risks, benefits, limitations, and healing process of the procedure. PROCEDURE The patient was brought to the operating room and anesthetized with local anesthetic. The identified and marked extremity was then prepped and draped in a standard fashion. A well-padded tourniquet was applied to the upper arm. The arm was exsanguinated and the tourniquet elevated to 250 mmHg. A standard transverse incision was made at the A-1 pulley level of the affected digit(s). Dissection was carried down carefully through the skin and subcutaneous tissue. Care was taken to protect the digital neurovascular bundles on both sides of the identified A-1 pulley(s). In each affected digit the pulley was incised longitudinally under direct visualization. Complete proximal and distal release was completed. The flexor tendon structures were withdrawn with a retractor and demonstrated full passive excursion. The tourniquet was released and the wound(s) irrigated with sterile saline. Skin was closed with Nylon suture. A soft, bulky dressing was applied and the patient transported to the recovery area in stable condition with good perfusion to all fingertips and no active triggering.         [address]

## 2020-03-11 ENCOUNTER — HOSPITAL ENCOUNTER (OUTPATIENT)
Age: 55
Setting detail: OUTPATIENT SURGERY
Discharge: HOME | End: 2020-03-11
Attending: ORTHOPAEDIC SURGERY | Admitting: ORTHOPAEDIC SURGERY
Payer: COMMERCIAL

## 2020-03-11 VITALS
TEMPERATURE: 97.7 F | HEART RATE: 67 BPM | OXYGEN SATURATION: 97 % | HEIGHT: 72 IN | DIASTOLIC BLOOD PRESSURE: 89 MMHG | RESPIRATION RATE: 16 BRPM | BODY MASS INDEX: 32.51 KG/M2 | SYSTOLIC BLOOD PRESSURE: 138 MMHG | WEIGHT: 240 LBS

## 2020-03-11 PROCEDURE — 2580000003 HC RX 258: Performed by: ORTHOPAEDIC SURGERY

## 2020-03-11 PROCEDURE — 2500000003 HC RX 250 WO HCPCS: Performed by: ORTHOPAEDIC SURGERY

## 2020-03-11 PROCEDURE — 2709999900 HC NON-CHARGEABLE SUPPLY: Performed by: ORTHOPAEDIC SURGERY

## 2020-03-11 PROCEDURE — 3600000002 HC SURGERY LEVEL 2 BASE: Performed by: ORTHOPAEDIC SURGERY

## 2020-03-11 PROCEDURE — 3600000012 HC SURGERY LEVEL 2 ADDTL 15MIN: Performed by: ORTHOPAEDIC SURGERY

## 2020-03-11 PROCEDURE — 7100000010 HC PHASE II RECOVERY - FIRST 15 MIN: Performed by: ORTHOPAEDIC SURGERY

## 2020-03-11 RX ORDER — BUPIVACAINE HYDROCHLORIDE 5 MG/ML
INJECTION, SOLUTION EPIDURAL; INTRACAUDAL PRN
Status: DISCONTINUED | OUTPATIENT
Start: 2020-03-11 | End: 2020-03-11 | Stop reason: ALTCHOICE

## 2020-03-11 RX ORDER — IBUPROFEN 600 MG/1
600 TABLET ORAL EVERY 6 HOURS PRN
Qty: 60 TABLET | Refills: 1 | Status: SHIPPED | OUTPATIENT
Start: 2020-03-11

## 2020-03-11 RX ORDER — MAGNESIUM HYDROXIDE 1200 MG/15ML
LIQUID ORAL CONTINUOUS PRN
Status: COMPLETED | OUTPATIENT
Start: 2020-03-11 | End: 2020-03-11

## 2020-03-11 RX ORDER — HYDROCODONE BITARTRATE AND ACETAMINOPHEN 5; 325 MG/1; MG/1
1 TABLET ORAL EVERY 6 HOURS PRN
Qty: 10 TABLET | Refills: 0 | Status: SHIPPED | OUTPATIENT
Start: 2020-03-11 | End: 2020-03-18

## 2020-03-11 ASSESSMENT — PAIN SCALES - GENERAL: PAINLEVEL_OUTOF10: 0

## 2020-03-11 ASSESSMENT — PAIN - FUNCTIONAL ASSESSMENT: PAIN_FUNCTIONAL_ASSESSMENT: 0-10

## 2020-03-19 ENCOUNTER — OFFICE VISIT (OUTPATIENT)
Dept: ORTHOPEDIC SURGERY | Age: 55
End: 2020-03-19

## 2020-03-19 VITALS — BODY MASS INDEX: 32.52 KG/M2 | RESPIRATION RATE: 16 BRPM | WEIGHT: 240.08 LBS | HEIGHT: 72 IN

## 2020-03-19 PROCEDURE — 99024 POSTOP FOLLOW-UP VISIT: CPT | Performed by: ORTHOPAEDIC SURGERY

## 2020-11-03 PROBLEM — I10 HBP (HIGH BLOOD PRESSURE): Status: RESOLVED | Noted: 2020-11-03 | Resolved: 2020-11-03

## 2024-02-02 ENCOUNTER — APPOINTMENT (OUTPATIENT)
Dept: GENERAL RADIOLOGY | Age: 59
End: 2024-02-02
Payer: COMMERCIAL

## 2024-02-02 ENCOUNTER — APPOINTMENT (OUTPATIENT)
Dept: CT IMAGING | Age: 59
End: 2024-02-02
Payer: COMMERCIAL

## 2024-02-02 ENCOUNTER — HOSPITAL ENCOUNTER (EMERGENCY)
Age: 59
Discharge: HOME | End: 2024-02-02
Attending: EMERGENCY MEDICINE
Payer: COMMERCIAL

## 2024-02-02 VITALS
OXYGEN SATURATION: 100 % | WEIGHT: 248 LBS | HEIGHT: 72 IN | TEMPERATURE: 98.4 F | HEART RATE: 50 BPM | DIASTOLIC BLOOD PRESSURE: 88 MMHG | SYSTOLIC BLOOD PRESSURE: 127 MMHG | RESPIRATION RATE: 18 BRPM | BODY MASS INDEX: 33.59 KG/M2

## 2024-02-02 DIAGNOSIS — Q28.1: ICD-10-CM

## 2024-02-02 DIAGNOSIS — H02.402 PTOSIS OF EYELID, LEFT: Primary | ICD-10-CM

## 2024-02-02 DIAGNOSIS — I67.1 CEREBRAL ANEURYSM: ICD-10-CM

## 2024-02-02 LAB
ALBUMIN SERPL-MCNC: 4.2 G/DL (ref 3.4–5)
ALBUMIN/GLOB SERPL: 1.5 {RATIO} (ref 1.1–2.2)
ALP SERPL-CCNC: 76 U/L (ref 40–129)
ALT SERPL-CCNC: 29 U/L (ref 10–40)
ANION GAP SERPL CALCULATED.3IONS-SCNC: 13 MMOL/L (ref 3–16)
AST SERPL-CCNC: 22 U/L (ref 15–37)
BASOPHILS # BLD: 0.1 K/UL (ref 0–0.2)
BASOPHILS NFR BLD: 1.2 %
BILIRUB SERPL-MCNC: 0.6 MG/DL (ref 0–1)
BUN SERPL-MCNC: 18 MG/DL (ref 7–20)
CALCIUM SERPL-MCNC: 9.1 MG/DL (ref 8.3–10.6)
CHLORIDE SERPL-SCNC: 97 MMOL/L (ref 99–110)
CO2 SERPL-SCNC: 26 MMOL/L (ref 21–32)
CREAT SERPL-MCNC: 1 MG/DL (ref 0.9–1.3)
DEPRECATED RDW RBC AUTO: 14.6 % (ref 12.4–15.4)
EOSINOPHIL # BLD: 0.2 K/UL (ref 0–0.6)
EOSINOPHIL NFR BLD: 2.7 %
GFR SERPLBLD CREATININE-BSD FMLA CKD-EPI: >60 ML/MIN/{1.73_M2}
GLUCOSE BLD-MCNC: 89 MG/DL (ref 70–99)
GLUCOSE SERPL-MCNC: 93 MG/DL (ref 70–99)
HCT VFR BLD AUTO: 49.7 % (ref 40.5–52.5)
HGB BLD-MCNC: 16.6 G/DL (ref 13.5–17.5)
INR PPP: 0.98 (ref 0.84–1.16)
LYMPHOCYTES # BLD: 1.7 K/UL (ref 1–5.1)
LYMPHOCYTES NFR BLD: 19.3 %
MAGNESIUM SERPL-MCNC: 1.8 MG/DL (ref 1.8–2.4)
MCH RBC QN AUTO: 27 PG (ref 26–34)
MCHC RBC AUTO-ENTMCNC: 33.4 G/DL (ref 31–36)
MCV RBC AUTO: 80.7 FL (ref 80–100)
MONOCYTES # BLD: 0.7 K/UL (ref 0–1.3)
MONOCYTES NFR BLD: 7.8 %
NEUTROPHILS # BLD: 6 K/UL (ref 1.7–7.7)
NEUTROPHILS NFR BLD: 69 %
PERFORMED ON: NORMAL
PLATELET # BLD AUTO: 309 K/UL (ref 135–450)
PMV BLD AUTO: 6.7 FL (ref 5–10.5)
POTASSIUM SERPL-SCNC: 3.4 MMOL/L (ref 3.5–5.1)
PROT SERPL-MCNC: 7 G/DL (ref 6.4–8.2)
PROTHROMBIN TIME: 13 SEC (ref 11.5–14.8)
RBC # BLD AUTO: 6.16 M/UL (ref 4.2–5.9)
SODIUM SERPL-SCNC: 136 MMOL/L (ref 136–145)
TROPONIN, HIGH SENSITIVITY: 10 NG/L (ref 0–22)
WBC # BLD AUTO: 8.7 K/UL (ref 4–11)

## 2024-02-02 PROCEDURE — 99285 EMERGENCY DEPT VISIT HI MDM: CPT

## 2024-02-02 PROCEDURE — 70450 CT HEAD/BRAIN W/O DYE: CPT

## 2024-02-02 PROCEDURE — 6360000004 HC RX CONTRAST MEDICATION: Performed by: EMERGENCY MEDICINE

## 2024-02-02 PROCEDURE — 96374 THER/PROPH/DIAG INJ IV PUSH: CPT

## 2024-02-02 PROCEDURE — 85610 PROTHROMBIN TIME: CPT

## 2024-02-02 PROCEDURE — 85025 COMPLETE CBC W/AUTO DIFF WBC: CPT

## 2024-02-02 PROCEDURE — 70498 CT ANGIOGRAPHY NECK: CPT

## 2024-02-02 PROCEDURE — 83735 ASSAY OF MAGNESIUM: CPT

## 2024-02-02 PROCEDURE — 84484 ASSAY OF TROPONIN QUANT: CPT

## 2024-02-02 PROCEDURE — 6370000000 HC RX 637 (ALT 250 FOR IP): Performed by: EMERGENCY MEDICINE

## 2024-02-02 PROCEDURE — 80053 COMPREHEN METABOLIC PANEL: CPT

## 2024-02-02 PROCEDURE — 6360000002 HC RX W HCPCS: Performed by: EMERGENCY MEDICINE

## 2024-02-02 RX ORDER — POTASSIUM CHLORIDE 20 MEQ/1
40 TABLET, EXTENDED RELEASE ORAL ONCE
Status: DISCONTINUED | OUTPATIENT
Start: 2024-02-02 | End: 2024-02-03 | Stop reason: HOSPADM

## 2024-02-02 RX ORDER — DEXAMETHASONE SODIUM PHOSPHATE 4 MG/ML
10 INJECTION, SOLUTION INTRA-ARTICULAR; INTRALESIONAL; INTRAMUSCULAR; INTRAVENOUS; SOFT TISSUE ONCE
Status: COMPLETED | OUTPATIENT
Start: 2024-02-02 | End: 2024-02-02

## 2024-02-02 RX ORDER — ASPIRIN 81 MG/1
324 TABLET, CHEWABLE ORAL ONCE
Status: COMPLETED | OUTPATIENT
Start: 2024-02-02 | End: 2024-02-02

## 2024-02-02 RX ORDER — PREDNISONE 10 MG/1
TABLET ORAL
Qty: 39 TABLET | Refills: 0 | Status: SHIPPED | OUTPATIENT
Start: 2024-02-02 | End: 2024-02-02

## 2024-02-02 RX ORDER — PREDNISONE 10 MG/1
TABLET ORAL
Qty: 39 TABLET | Refills: 0 | Status: SHIPPED | OUTPATIENT
Start: 2024-02-02 | End: 2024-02-11

## 2024-02-02 RX ORDER — ASPIRIN 81 MG/1
81 TABLET, CHEWABLE ORAL DAILY
Qty: 30 TABLET | Refills: 0 | Status: SHIPPED | OUTPATIENT
Start: 2024-02-02 | End: 2024-03-03

## 2024-02-02 RX ORDER — ASPIRIN 81 MG/1
81 TABLET, CHEWABLE ORAL DAILY
Qty: 30 TABLET | Refills: 0 | Status: SHIPPED | OUTPATIENT
Start: 2024-02-02 | End: 2024-02-02

## 2024-02-02 RX ADMIN — DEXAMETHASONE SODIUM PHOSPHATE 10 MG: 4 INJECTION, SOLUTION INTRAMUSCULAR; INTRAVENOUS at 20:46

## 2024-02-02 RX ADMIN — ASPIRIN 81 MG 324 MG: 81 TABLET ORAL at 23:17

## 2024-02-02 RX ADMIN — IOPAMIDOL 75 ML: 755 INJECTION, SOLUTION INTRAVENOUS at 17:28

## 2024-02-02 ASSESSMENT — VISUAL ACUITY
OU: 20 FT
OD: 20/30
OS: 20/30

## 2024-02-02 ASSESSMENT — PAIN - FUNCTIONAL ASSESSMENT: PAIN_FUNCTIONAL_ASSESSMENT: NONE - DENIES PAIN

## 2024-02-02 ASSESSMENT — LIFESTYLE VARIABLES
HOW OFTEN DO YOU HAVE A DRINK CONTAINING ALCOHOL: MONTHLY OR LESS
HOW MANY STANDARD DRINKS CONTAINING ALCOHOL DO YOU HAVE ON A TYPICAL DAY: 1 OR 2

## 2024-02-02 NOTE — ED PROVIDER NOTES
to do so, but return to ED immediately if symptoms worsen prior to that follow up.  He was well-appearing and in no acute distress at time of discharge, and the patient and his wife agreed with this plan.    I also told him that if he feels like his left eye is not closing appropriately, he should wear an eye patch to avoid scratching it at nighttime to avoid any corneal ulcer and ultimately may need to see ophthalmology related to this.  His visual acuities were overall normal in the emergency department.    I was the primary provider for the patient.      The patient tolerated their visit well.   The patient and / or the family were informed of the results of any tests, a time was given to answer questions.    FINAL IMPRESSION      1. Ptosis of eyelid, left    2. Cerebral aneurysm    3. Abnormality of basilar artery summit          DISPOSITION/PLAN   DISPOSITION Decision To Discharge 02/02/2024 11:09:49 PM      PATIENT REFERRED TO:  [facility]  ED  [address]  [facility] [address] [phone]  Go to   If symptoms worsen    Daniela Sapp DO  [address]  [facility] [address]    In 3 days      Jostin Deleon MD  [address]  [address]  [facility] [id number] [phone]    In 2 weeks  Neurosurgery    Sophia Mccoy MD  [address]  [address]  [facility] [id number] [phone]    Schedule an appointment as soon as possible for a visit in 3 days  For urgent evaluation due to concern for ptosis and needing MRI    [facility]  [address]  [facility] [id number] [phone]            DISCHARGEMEDICATIONS:  Discharge Medication List as of 2/2/2024 11:17 PM        START taking these medications    Details   aspirin (ASPIRIN CHILDRENS) 81 MG chewable tablet Take 1 tablet by mouth daily, Disp-30 tablet, R-0Print      predniSONE (DELTASONE) 10 MG tablet Take 6 tablets by mouth daily for 4 days, THEN 5 tablets daily for 1 day, THEN 4 tablets daily for 1 day,  THEN 3 tablets daily for 1 day, THEN 2 tablets daily for 1 day, THEN 1 tablet daily for 1 day., Disp-39 tablet, R-0Print             DISCONTINUED MEDICATIONS:  Discharge Medication List as of 2/2/2024 11:17 PM                 (Please note that portions of this note were completed with a voicerecognition program.  Efforts were made to edit the dictations but occasionally words are mis-transcribed.)    Munir Ceron MD (electronically signed)            Munir Ceron MD  02/04/24 [id number]

## 2024-02-02 NOTE — ED NOTES
1621 - called  stroke team   Re: left eyelid droop  1627 - Dr Woods called back to speak with Dr Ceron

## 2024-02-03 NOTE — ED NOTES
Writer reviewed discharge instructions. patient verbalized understanding. Patient's IV removed with no complications with dressing in place. Patient stable, ambulatory with steady gait, GCS 15, no signs/ symptoms of acute distress, respirations unlabored, and with family. Patient discharged with documented belongings.

## 2024-02-03 NOTE — CONSULTS
2020- Attempted to call [facility], on hold for 15 minutes   2035- Attempted to call again, spoke to Genny RN  Per   RE abnormal CTA - basilar artery  AC attempted to direct call on call Neuro surgeon, Lizbeth Castro NP on the line with Dr. Ceron @ 2045  Per Gloria NP, will speak to Neurosurgery Attending Dr. Deleon and call back  2123- AC contacted Gloria NP for follow up. Per NP, still waiting to hear from Dr. Deleon.  2150- Called [facility] for update, Per Carolina SANTANA attending is in  procedure, will attempt to repage.  2215- Per Gloria NP, has attempted to page 2 neurosurgeons now with no contact. Calling Dr. Deleon again for dispo  2245- Paged on call Neurosurgeon for Harini Munoz.  2246- Dr. Mckee returned page, stating we need to speak to Vascular surgeon for patient but will call transfer center to see what is going on.  2249- Natty RN called to speak to Dr. Ceron about patient.  Per Dr. Deleon, patient can be admitted to be seen in AM  Per Dr. Ceron, Pt wants to go home and follow up outpatient

## 2024-02-03 NOTE — DISCHARGE INSTRUCTIONS
We did recommend admission but at this time you are declining.    If you develop any new neurological changes with severe eye pain, visual changes, chest pain, numbness or weakness on one side of the body, trouble swallowing, or any other concerns over the next 3 to 24 hours, then return to the emergency department immediately for further evaluation.    Follow-up with neurology and neurosurgery as discussed due to concern for possible aneurysm along with the left eyelid droop.  Call neurology for appointment urgently and call neurosurgery for appointment in 2 weeks.    Call your primary doctor for any other concerns.    Take your baby aspirin daily until told otherwise by neurology.  Take prednisone as prescribed due to concern for possible Bell's palsy.    Use an eye patch at night to avoid any corneal issues.  Follow-up with ophthalmology for any concerns with your eyelid.

## 2024-02-08 NOTE — PLAN OF CARE
Patient is a 57 y/o w/ PMH HTN, HLD, HLD, prediabetes, migraine, GERD, thyroid CA s/p thyroidectomy who presented to [facility] with cc of left eye ptosis. Per ED provider, patient received a shingles vaccine earlier today. Later he was out with his wife, who noticed that he had left eye droop around 1500. He presented to the ED for further evaluation. Stroke team was contacted who reportedly was worried about carotid artery dissection and recommended getting a CTA. CTH notabe for mild dilation of the tip of the basilar but otherwise unremarkable. CTA notable for incidental ectatic basilar artery measuring up to 5.5 mm in diameter and 2.5 mm prominent infundibulum vs saccular aneurysm along the mid M1 segment of the right MCA, otherwise no LVO or severe intracranial stenosis.     EXAM (per ED provider)  GCS 15. Patient noted to have left upper eyelid ptosis on exam, without facial droop, normal puffing of cheeks. No facial asymmetry. No dysarthria. Sensation intact throughout. No ataxia. NIHSS 0. Motor exam: 5/5 throughout.     NCC was consulted in regards to ectatic basilar.     Plan:  - Can f/u outpatient with Dr. Deleon for ectatic basilar artery and 2.5 mm prominent infundibulum vs saccular aneurysm along the mid M1 segment of the right MCA within 1 month.  - In regards to eye ptosis, recommend MRI given vascular risk factors (HTN, HLD, prediabetes, ODALYS) to r/o central cause.Neurology consult while at [place]  - Can start 81 mg ASA and statin if not already on it.   - Was informed patient may not want to say. I informed ED provider if patient leaves, f/u Dr. Mccoy in 1-2 weeks with [facility] neurology.

## 2024-04-26 NOTE — PRE-PROCEDURE INSTRUCTIONS
Attempted to call patient about procedure. No answer. Voicemail left. Told to be here at 1030 for procedure at 1200. NPO after midnight, but can take morning medication with sips of water, office should have told them when and if they should stop any blood thinners. To have a responsible adult be with patient take them home and stay with them afterwards, if they are not admitted to hospital afterwards. And if available bring current list of medications.

## 2024-04-29 ENCOUNTER — HOSPITAL ENCOUNTER (OUTPATIENT)
Dept: INTERVENTIONAL RADIOLOGY/VASCULAR | Age: 59
Discharge: HOME | End: 2024-04-29
Payer: COMMERCIAL

## 2024-04-29 VITALS
DIASTOLIC BLOOD PRESSURE: 77 MMHG | RESPIRATION RATE: 16 BRPM | SYSTOLIC BLOOD PRESSURE: 123 MMHG | TEMPERATURE: 97 F | BODY MASS INDEX: 34.4 KG/M2 | OXYGEN SATURATION: 96 % | WEIGHT: 254 LBS | HEART RATE: 63 BPM | HEIGHT: 72 IN

## 2024-04-29 DIAGNOSIS — I67.1 INTRACRANIAL ANEURYSM: Primary | ICD-10-CM

## 2024-04-29 LAB
ALBUMIN SERPL-MCNC: 4.1 G/DL (ref 3.4–5)
ALBUMIN/GLOB SERPL: 1.5 {RATIO} (ref 1.1–2.2)
ALP SERPL-CCNC: 86 U/L (ref 40–129)
ALT SERPL-CCNC: 23 U/L (ref 10–40)
ANION GAP SERPL CALCULATED.3IONS-SCNC: 6 MMOL/L (ref 3–16)
AST SERPL-CCNC: 22 U/L (ref 15–37)
BILIRUB SERPL-MCNC: 0.7 MG/DL (ref 0–1)
BUN SERPL-MCNC: 15 MG/DL (ref 7–20)
CALCIUM SERPL-MCNC: 9.4 MG/DL (ref 8.3–10.6)
CHLORIDE SERPL-SCNC: 104 MMOL/L (ref 99–110)
CO2 SERPL-SCNC: 26 MMOL/L (ref 21–32)
CREAT SERPL-MCNC: 1 MG/DL (ref 0.8–1.3)
DEPRECATED RDW RBC AUTO: 14.6 % (ref 12.4–15.4)
GFR SERPLBLD CREATININE-BSD FMLA CKD-EPI: 86 ML/MIN/{1.73_M2}
GLUCOSE SERPL-MCNC: 105 MG/DL (ref 70–99)
HCT VFR BLD AUTO: 47.4 % (ref 40.5–52.5)
HGB BLD-MCNC: 16 G/DL (ref 13.5–17.5)
INR PPP: 1.04 (ref 0.85–1.15)
MCH RBC QN AUTO: 27.5 PG (ref 26–34)
MCHC RBC AUTO-ENTMCNC: 33.7 G/DL (ref 31–36)
MCV RBC AUTO: 81.7 FL (ref 80–100)
PLATELET # BLD AUTO: 251 K/UL (ref 135–450)
PMV BLD AUTO: 6.8 FL (ref 5–10.5)
POTASSIUM SERPL-SCNC: 3.7 MMOL/L (ref 3.5–5.1)
PROT SERPL-MCNC: 6.9 G/DL (ref 6.4–8.2)
PROTHROMBIN TIME: 13.8 SEC (ref 11.9–14.9)
RBC # BLD AUTO: 5.81 M/UL (ref 4.2–5.9)
SODIUM SERPL-SCNC: 136 MMOL/L (ref 136–145)
WBC # BLD AUTO: 6.2 K/UL (ref 4–11)

## 2024-04-29 PROCEDURE — 6370000000 HC RX 637 (ALT 250 FOR IP): Performed by: NEUROLOGICAL SURGERY

## 2024-04-29 PROCEDURE — 36226 PLACE CATH VERTEBRAL ART: CPT

## 2024-04-29 PROCEDURE — C1760 CLOSURE DEV, VASC: HCPCS

## 2024-04-29 PROCEDURE — 85610 PROTHROMBIN TIME: CPT

## 2024-04-29 PROCEDURE — 80053 COMPREHEN METABOLIC PANEL: CPT

## 2024-04-29 PROCEDURE — 99153 MOD SED SAME PHYS/QHP EA: CPT

## 2024-04-29 PROCEDURE — C1894 INTRO/SHEATH, NON-LASER: HCPCS

## 2024-04-29 PROCEDURE — C1887 CATHETER, GUIDING: HCPCS

## 2024-04-29 PROCEDURE — 6360000004 HC RX CONTRAST MEDICATION: Performed by: NEUROLOGICAL SURGERY

## 2024-04-29 PROCEDURE — 85027 COMPLETE CBC AUTOMATED: CPT

## 2024-04-29 PROCEDURE — C1769 GUIDE WIRE: HCPCS

## 2024-04-29 PROCEDURE — 36224 PLACE CATH CAROTD ART: CPT

## 2024-04-29 PROCEDURE — 99152 MOD SED SAME PHYS/QHP 5/>YRS: CPT

## 2024-04-29 RX ORDER — ACETAMINOPHEN 325 MG/1
650 TABLET ORAL EVERY 4 HOURS PRN
Status: DISCONTINUED | OUTPATIENT
Start: 2024-04-29 | End: 2024-04-30 | Stop reason: HOSPADM

## 2024-04-29 RX ORDER — HYDROCODONE BITARTRATE AND ACETAMINOPHEN 5; 325 MG/1; MG/1
1 TABLET ORAL EVERY 4 HOURS PRN
Status: DISCONTINUED | OUTPATIENT
Start: 2024-04-29 | End: 2024-04-30 | Stop reason: HOSPADM

## 2024-04-29 RX ORDER — SODIUM CHLORIDE 9 MG/ML
INJECTION, SOLUTION INTRAVENOUS CONTINUOUS
Status: DISCONTINUED | OUTPATIENT
Start: 2024-04-29 | End: 2024-04-30 | Stop reason: HOSPADM

## 2024-04-29 RX ORDER — HYDROCODONE BITARTRATE AND ACETAMINOPHEN 5; 325 MG/1; MG/1
2 TABLET ORAL EVERY 4 HOURS PRN
Status: DISCONTINUED | OUTPATIENT
Start: 2024-04-29 | End: 2024-04-30 | Stop reason: HOSPADM

## 2024-04-29 RX ORDER — ONDANSETRON 2 MG/ML
4 INJECTION INTRAMUSCULAR; INTRAVENOUS EVERY 8 HOURS PRN
Status: DISCONTINUED | OUTPATIENT
Start: 2024-04-29 | End: 2024-04-30 | Stop reason: HOSPADM

## 2024-04-29 RX ORDER — ASPIRIN 325 MG
325 TABLET ORAL DAILY
Status: DISCONTINUED | OUTPATIENT
Start: 2024-04-29 | End: 2024-04-30 | Stop reason: HOSPADM

## 2024-04-29 RX ORDER — IODIXANOL 270 MG/ML
200 INJECTION, SOLUTION INTRAVASCULAR ONCE
Status: COMPLETED | OUTPATIENT
Start: 2024-04-29 | End: 2024-04-29

## 2024-04-29 RX ADMIN — ACETAMINOPHEN 650 MG: 325 TABLET ORAL at 14:55

## 2024-04-29 RX ADMIN — IODIXANOL 70 ML: 270 INJECTION, SOLUTION INTRAVASCULAR at 12:44

## 2024-04-29 RX ADMIN — ASPIRIN 325 MG: 325 TABLET ORAL at 11:27

## 2024-04-29 ASSESSMENT — PAIN DESCRIPTION - LOCATION: LOCATION: HEAD

## 2024-04-29 ASSESSMENT — PAIN DESCRIPTION - DESCRIPTORS: DESCRIPTORS: ACHING

## 2024-04-29 ASSESSMENT — PAIN SCALES - GENERAL: PAINLEVEL_OUTOF10: 5

## 2024-04-29 NOTE — DISCHARGE INSTRUCTIONS
[facility]  Cardiovascular Special Procedures  Angiogram/Cardiac Catheterization  Patient Discharge Instructions           Day 1 (Procedure Day):  Rest for the remainder of the day.  Minimal stair climbing, if you must use stairs, lead with your unaffected leg.  Do not drive a car.  Do not be alone.  Avoid prolonged sitting, walk around occasionally until bedtime tonight.  Leave dressing intact.    Day 2:  You may climb stairs, begin slowly  You may drive a car, unless otherwise directed by physician.  Remove dressing.  You may bathe/shower, but wash gently around the puncture site and pat dry.  Place new band-aid on site daily until skin heals.    Things to Avoid:  You may not do any strenuous exercises for one week. (ex: golfing, bowling, tennis, vacuum, snow removal, jogging, and aerobic exercises).  No hot tubs, baths, or pools for one week.  Do not lift anything over 10 pounds for 10 days.  Avoid positions that would put pressure on your groin. Like sitting upright in a straight back chair.  No lotions, powders, or ointments near site for 5 days.    Things to watch for:  You may have a small lump or a bruise.  This is common and will go away.  If the lump increases and site becomes painful, call physician immediately.  If mild discomfort occurs at the puncture site you may place an ice pack on the site at 15 minute intervals.  If the puncture site starts to bleed, immediately lie on a hard flat surface and apply pressure just above the puncture site.  Have someone call 911 and maintain pressure until the EMS arrives.  If you have loss of color, extreme coldness or numbness of the leg, call 911 immediately.  Excessive pain of the groin, thigh or calf, call your physician immediately.   Watch for signs and symptoms of an infection at the groin site (fever, local pain, redness, warmth or discharge from the puncture site), call your physician immediately if any develop.      Any Questions please call us

## 2024-04-29 NOTE — PROCEDURES
DATE OF THE PROCEDURE: 4/29/2024  HISTORY OF PRESENT ILLNESS: 59 year old man suspected to have a small right middle cerebral artery aneurysm vs infundibulum presents for diagnostic angiography.    OPERATORS: Jostin Deleon M.D., attending.     SUPERVISION AND INTERPRETATION: Dr. Jostin Deleon personally performed the technical portions of the procedure.  Following completion of the technical portions of the procedure, the angiographic images were reviewed at the neurography PACS work station by Dr. Deleon.    PROCEDURE:  1. Diagnostic Cerebral Angiogram.     VESSELS CATHETERIZED:   1. Right internal carotid artery.   2. Left internal carotid artery.   3. Left vertebral artery.   4. Right vertebral artery.     ANESTHESIA: Prior to the procedure, the patient's personal and family history were reviewed for any adverse reactions to anesthesia and no pertinent concerns were raised.  ASA rdgrdrrdarddrderd:rd rd3rd Malampati: II  Conscious sedation and intravenous anesthesia was given by the radiology nurse under direct supervision of the attending physician for 30 minutes. Monitored nursing care and medication reports are available on the chart. Local anesthesia was provided at the puncture site with 1% lidocaine.     MEDICATIONS GIVEN: Fentanyl IV and Versed IV, 1% lidocaine, subcutaneous.     RADIOCONTRAST: 70 mL Optiray 320 contrast IA.   EBL: 10 mL    DEVICES USED:   1. Micropuncture kit.   2. 5-Ecuadorean sheath.   3. 0.035\" Glidewire.   4. 5-Ecuadorean angled glide catheter.   5. 6-Ecuadorean AngioSeal closure device.     PROCEDURE TIME: 30 minutes.   FLUOROSCOPY TIME: 5.1 minutes.     CONSENT: Prior to the procedure, the technical aspect of the procedure as well as the potential risks and benefits were explained to the patient. Specifically, the risk of cerebral infarction, puncture site hemorrhage, femoral nerve injury, retroperitoneal hemorrhage, hemorrhagic shock, infection, device failure, anaphylaxis, renal failure, limb loss, death,

## 2024-04-29 NOTE — PROGRESS NOTES
Patient/family given discharge instructions. Patient/family verbalize understanding of discharge instructions, all questions addressed, copy given to patient/family. Pt transferred to vehicle via wheelchair to be discharged home with family.  PIV removed.

## 2024-04-29 NOTE — PROGRESS NOTES
Cath Lab Pre Procedure Flowsheet    Plan of Care: Cerebral Angiogram    Hemodynamics and cardiac rhythm will remain stable.   Comfort level will be maintained.   Respiratory function will remain adequate.   Pt/family will verbalize understanding of the procedure.   Procedure will be tolerated without complications.   Patient will recover from procedure without complications.   ID armband on patient and identification verified.   Informed consent obtained.   Non invasive blood pressure cuff applied, monitoring initiated.   EKG pads and pulse oximeter applied, monitoring initiated.   Instructions given. Patient and / or family verbalize understanding.   H&P will be documented by physician in Epic.     Pre-procedure:    NPO Status: Pt has been NPO since midnight. .    Contrast / IV Dye Allergy: None    Pregnancy Test: N/A.    Prep Sites: Bilateral Groin      Pulses:   Rt & Lft PT: 2+  Rt & Lft DP: 2+  Rt & Lft Fem: 1+    Anticoagulants: None.     Antiplatelets: None.       Diabetic: Yes, Oral Treatment    Pre EKG Rhythm: NSR    Pre SBP: 137    IV access: 20g AC Rt PIV    Pre-procedure blood work collected by: OG RN    NIH: 0

## 2024-04-29 NOTE — H&P
[facility] 2013 - Normal, BA appears similar in size to me    Assessment   Impression: Mr. 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 I explained to him the risk of spontaneous hemorrhage due to size and location of the aneurysm as well as the risks of surgery or coil embolization based on characteristics of his aneurysm and his age. In fact, I am not convinced that he has a true saccular aneurysm and not simply a benign infundibulum. Given the relatively benign natural history, he has agreed to a course of observation, but I did offer angiography to confirm the diagnosis to see if surveillance is necessary.  I discussed appropriate activity restrictions but have permitted driving and flying.   We will obtain MRA every 6 months for 2 years, then annually if he choses surveillance alone.

## 2024-05-08 ENCOUNTER — HOSPITAL ENCOUNTER (OUTPATIENT)
Dept: VASCULAR LAB | Age: 59
Discharge: HOME | End: 2024-05-10
Attending: NEUROLOGICAL SURGERY
Payer: COMMERCIAL

## 2024-05-08 DIAGNOSIS — I82.492 DEEP VEIN THROMBOSIS (DVT) OF OTHER VEIN OF LEFT LOWER EXTREMITY, UNSPECIFIED CHRONICITY (HCC): ICD-10-CM

## 2024-05-08 PROCEDURE — 93971 EXTREMITY STUDY: CPT

## 2024-05-08 PROCEDURE — 93971 EXTREMITY STUDY: CPT | Performed by: SURGERY

## 2024-05-17 ENCOUNTER — TRANSCRIBE ORDERS (OUTPATIENT)
Dept: ADMINISTRATIVE | Age: 59
End: 2024-05-17

## 2024-05-17 DIAGNOSIS — R20.0 NUMBNESS: Primary | ICD-10-CM

## 2024-05-24 ENCOUNTER — HOSPITAL ENCOUNTER (OUTPATIENT)
Dept: MRI IMAGING | Age: 59
Discharge: HOME | End: 2024-05-24
Payer: COMMERCIAL

## 2024-05-24 DIAGNOSIS — R20.0 NUMBNESS IN LEFT LEG: ICD-10-CM

## 2024-05-24 PROCEDURE — 72148 MRI LUMBAR SPINE W/O DYE: CPT

## 2024-09-10 ENCOUNTER — HOSPITAL ENCOUNTER (OUTPATIENT)
Dept: MRI IMAGING | Age: 59
Discharge: HOME | End: 2024-09-10
Attending: STUDENT IN AN ORGANIZED HEALTH CARE EDUCATION/TRAINING PROGRAM
Payer: COMMERCIAL

## 2024-09-10 DIAGNOSIS — M48.061 SPINAL STENOSIS OF LUMBAR REGION WITH RADICULOPATHY: ICD-10-CM

## 2024-09-10 DIAGNOSIS — M54.16 SPINAL STENOSIS OF LUMBAR REGION WITH RADICULOPATHY: ICD-10-CM

## 2024-09-10 DIAGNOSIS — M54.10 RADICULOPATHY, UNSPECIFIED SPINAL REGION: ICD-10-CM

## 2024-09-10 PROCEDURE — 72146 MRI CHEST SPINE W/O DYE: CPT

## (undated) DEVICE — CORD ES L12FT BPLR FRCP

## (undated) DEVICE — BANDAGE GZ W2XL75IN ST RAYON POLY CNFRM STRTCH LTWT

## (undated) DEVICE — SUTURE ETHLN SZ 4-0 L18IN NONABSORBABLE BLK L19MM PS-2 3/8 1667H

## (undated) DEVICE — BLADE OPHTH 180DEG CUT SURF BLU STR SHRP DBL BVL GRINDLESS

## (undated) DEVICE — Device

## (undated) DEVICE — SYRINGE, LUER LOCK, 10ML: Brand: MEDLINE

## (undated) DEVICE — CHLORAPREP 26ML ORANGE

## (undated) DEVICE — BANDAGE COMPR W2INXL5YD HI E BGE W/ CLP SURE-WRAP

## (undated) DEVICE — ZIMMER® STERILE DISPOSABLE TOURNIQUET CUFF WITH PLC, DUAL PORT, SINGLE BLADDER, 18 IN. (46 CM)

## (undated) DEVICE — PADDING CAST W4INXL4YD NONSTERILE COT RAYON MICROPLEATED

## (undated) DEVICE — GLOVE SURG SZ 75 L12IN FNGR THK94MIL STD WHT LTX FREE

## (undated) DEVICE — STANDARD HYPODERMIC NEEDLE,POLYPROPYLENE HUB: Brand: MONOJECT

## (undated) DEVICE — NEEDLE HYPO 25GA L1.5IN BLU POLYPR HUB S STL REG BVL STR

## (undated) DEVICE — GOWN,SIRUS,NON REINFRCD,LARGE,SET IN SL: Brand: MEDLINE